# Patient Record
Sex: MALE | Race: OTHER
[De-identification: names, ages, dates, MRNs, and addresses within clinical notes are randomized per-mention and may not be internally consistent; named-entity substitution may affect disease eponyms.]

---

## 2019-10-17 ENCOUNTER — HOSPITAL ENCOUNTER (INPATIENT)
Dept: HOSPITAL 87 - ER | Age: 29
LOS: 2 days | Discharge: LEFT BEFORE DISCHARGE | DRG: 243 | End: 2019-10-19
Attending: INTERNAL MEDICINE | Admitting: INTERNAL MEDICINE
Payer: MEDICAID

## 2019-10-17 VITALS — DIASTOLIC BLOOD PRESSURE: 64 MMHG | SYSTOLIC BLOOD PRESSURE: 129 MMHG

## 2019-10-17 VITALS — WEIGHT: 132 LBS | HEIGHT: 64 IN | BODY MASS INDEX: 22.53 KG/M2

## 2019-10-17 VITALS — SYSTOLIC BLOOD PRESSURE: 128 MMHG | DIASTOLIC BLOOD PRESSURE: 81 MMHG

## 2019-10-17 VITALS — DIASTOLIC BLOOD PRESSURE: 90 MMHG | SYSTOLIC BLOOD PRESSURE: 140 MMHG

## 2019-10-17 DIAGNOSIS — R10.9: ICD-10-CM

## 2019-10-17 DIAGNOSIS — F12.90: ICD-10-CM

## 2019-10-17 DIAGNOSIS — K21.9: Primary | ICD-10-CM

## 2019-10-17 DIAGNOSIS — R07.9: ICD-10-CM

## 2019-10-17 DIAGNOSIS — Z53.29: ICD-10-CM

## 2019-10-17 DIAGNOSIS — R65.10: ICD-10-CM

## 2019-10-17 LAB
AMPHETAMINES UR QL SCN: NEGATIVE
APPEARANCE UR: CLEAR
APTT PPP: 28.4 SEC (ref 23.4–31)
BARBITURATES UR QL SCN: NEGATIVE
BASOPHILS NFR BLD AUTO: 0.4 % (ref 0–2)
BENZODIAZ UR QL SCN: NEGATIVE
BZE UR QL SCN: NEGATIVE
CANNABINOIDS UR QL SCN: (no result)
CHLORIDE SERPL-SCNC: 105 MEQ/L (ref 98–107)
CK MB SERPL-CCNC: < 1 NG/ML (ref 0.5–3.6)
CK SERPL-CCNC: 82 IU/L (ref 39–308)
COLOR UR: YELLOW
D DIMER PPP FEU-MCNC: 0.33 MG/L FEU (ref ?–0.5)
EOSINOPHIL NFR BLD AUTO: 0.5 % (ref 0–5)
ERYTHROCYTE [DISTWIDTH] IN BLOOD BY AUTOMATED COUNT: 15.3 % (ref 11.6–14.6)
HCT VFR BLD AUTO: 45 % (ref 42–52)
HGB BLD-MCNC: 15.2 G/DL (ref 14–18)
HGB UR QL STRIP: NEGATIVE
INR PPP: 0.9
KETONES UR STRIP-MCNC: NEGATIVE MG/DL
LEUKOCYTE ESTERASE UR QL STRIP: NEGATIVE
LYMPHOCYTES NFR BLD AUTO: 13.4 % (ref 20–50)
MCH RBC QN AUTO: 30.4 PG (ref 28–32)
MCV RBC AUTO: 90.1 FL (ref 80–94)
METHADONE UR QL SCN: NEGATIVE
MONOCYTES NFR BLD AUTO: 6.1 % (ref 2–8)
NEUTROPHILS NFR BLD AUTO: 79.6 % (ref 40–76)
NITRITE UR QL STRIP: NEGATIVE
OPIATES UR QL SCN: (no result)
PCP UR QL SCN: NEGATIVE
PH UR STRIP: 6.5 [PH] (ref 4.5–8)
PLATELET # BLD AUTO: 250 X1000/UL (ref 130–400)
PMV BLD AUTO: 8.6 FL (ref 7.4–10.4)
PROT UR QL STRIP: NEGATIVE
PROTHROMBIN TIME: 9.8 SEC (ref 9.6–11)
RBC # BLD AUTO: 4.99 MILL/UL (ref 4.7–6.1)
SP GR UR STRIP: 1.02 (ref 1–1.03)
UROBILINOGEN UR STRIP-MCNC: 1 E.U./DL (ref 0.2–1)

## 2019-10-17 PROCEDURE — 83036 HEMOGLOBIN GLYCOSYLATED A1C: CPT

## 2019-10-17 PROCEDURE — 71275 CT ANGIOGRAPHY CHEST: CPT

## 2019-10-17 PROCEDURE — 82553 CREATINE MB FRACTION: CPT

## 2019-10-17 PROCEDURE — 80061 LIPID PANEL: CPT

## 2019-10-17 PROCEDURE — 80305 DRUG TEST PRSMV DIR OPT OBS: CPT

## 2019-10-17 PROCEDURE — 83880 ASSAY OF NATRIURETIC PEPTIDE: CPT

## 2019-10-17 PROCEDURE — 82550 ASSAY OF CK (CPK): CPT

## 2019-10-17 PROCEDURE — 36415 COLL VENOUS BLD VENIPUNCTURE: CPT

## 2019-10-17 PROCEDURE — 85379 FIBRIN DEGRADATION QUANT: CPT

## 2019-10-17 PROCEDURE — 84484 ASSAY OF TROPONIN QUANT: CPT

## 2019-10-17 PROCEDURE — 84439 ASSAY OF FREE THYROXINE: CPT

## 2019-10-17 PROCEDURE — 99285 EMERGENCY DEPT VISIT HI MDM: CPT

## 2019-10-17 PROCEDURE — 71045 X-RAY EXAM CHEST 1 VIEW: CPT

## 2019-10-17 PROCEDURE — 84443 ASSAY THYROID STIM HORMONE: CPT

## 2019-10-17 PROCEDURE — 76700 US EXAM ABDOM COMPLETE: CPT

## 2019-10-17 PROCEDURE — 93005 ELECTROCARDIOGRAM TRACING: CPT

## 2019-10-17 PROCEDURE — 96374 THER/PROPH/DIAG INJ IV PUSH: CPT

## 2019-10-17 PROCEDURE — 93306 TTE W/DOPPLER COMPLETE: CPT

## 2019-10-17 PROCEDURE — 81003 URINALYSIS AUTO W/O SCOPE: CPT

## 2019-10-17 RX ADMIN — Medication SCH ML: at 13:21

## 2019-10-17 RX ADMIN — NITROGLYCERIN PRN MG: 0.4 TABLET SUBLINGUAL at 04:14

## 2019-10-17 RX ADMIN — NITROGLYCERIN PRN MG: 0.4 TABLET SUBLINGUAL at 04:08

## 2019-10-17 RX ADMIN — ENOXAPARIN SODIUM SCH MG: 100 INJECTION SUBCUTANEOUS at 12:11

## 2019-10-17 RX ADMIN — Medication SCH ML: at 22:38

## 2019-10-17 RX ADMIN — MORPHINE SULFATE PRN MG: 2 INJECTION, SOLUTION INTRAMUSCULAR; INTRAVENOUS at 13:21

## 2019-10-17 RX ADMIN — MORPHINE SULFATE PRN MG: 2 INJECTION, SOLUTION INTRAMUSCULAR; INTRAVENOUS at 17:53

## 2019-10-17 RX ADMIN — MORPHINE SULFATE PRN MG: 2 INJECTION, SOLUTION INTRAMUSCULAR; INTRAVENOUS at 09:13

## 2019-10-18 VITALS — DIASTOLIC BLOOD PRESSURE: 83 MMHG | SYSTOLIC BLOOD PRESSURE: 128 MMHG

## 2019-10-18 VITALS — SYSTOLIC BLOOD PRESSURE: 115 MMHG | DIASTOLIC BLOOD PRESSURE: 69 MMHG

## 2019-10-18 VITALS — DIASTOLIC BLOOD PRESSURE: 82 MMHG | SYSTOLIC BLOOD PRESSURE: 132 MMHG

## 2019-10-18 VITALS — DIASTOLIC BLOOD PRESSURE: 64 MMHG | SYSTOLIC BLOOD PRESSURE: 113 MMHG

## 2019-10-18 VITALS — DIASTOLIC BLOOD PRESSURE: 58 MMHG | SYSTOLIC BLOOD PRESSURE: 113 MMHG

## 2019-10-18 LAB
BASOPHILS NFR BLD AUTO: 0.3 % (ref 0–2)
CHLORIDE SERPL-SCNC: 103 MEQ/L (ref 98–107)
CK MB SERPL-CCNC: < 1 NG/ML (ref 0.5–3.6)
CK MB SERPL-CCNC: < 1 NG/ML (ref 0.5–3.6)
CK SERPL-CCNC: 73 IU/L (ref 39–308)
CK SERPL-CCNC: 82 IU/L (ref 39–308)
EOSINOPHIL NFR BLD AUTO: 2 % (ref 0–5)
ERYTHROCYTE [DISTWIDTH] IN BLOOD BY AUTOMATED COUNT: 15 % (ref 11.6–14.6)
HCT VFR BLD AUTO: 41.9 % (ref 42–52)
HDLC SERPL-MCNC: 50 MG/DL (ref 40–59)
HGB BLD-MCNC: 14 G/DL (ref 14–18)
LDLC SERPL DIRECT ASSAY-MCNC: 103 MG/DL (ref 5–100)
LYMPHOCYTES NFR BLD AUTO: 27.3 % (ref 20–50)
MCH RBC QN AUTO: 30.1 PG (ref 28–32)
MCV RBC AUTO: 90.1 FL (ref 80–94)
MONOCYTES NFR BLD AUTO: 9.7 % (ref 2–8)
NEUTROPHILS NFR BLD AUTO: 60.7 % (ref 40–76)
PLATELET # BLD AUTO: 213 X1000/UL (ref 130–400)
PMV BLD AUTO: 8.5 FL (ref 7.4–10.4)
RBC # BLD AUTO: 4.65 MILL/UL (ref 4.7–6.1)
T4 FREE SERPL-MCNC: 1.11 NG/DL (ref 0.76–1.46)

## 2019-10-18 RX ADMIN — MORPHINE SULFATE PRN MG: 2 INJECTION, SOLUTION INTRAMUSCULAR; INTRAVENOUS at 16:23

## 2019-10-18 RX ADMIN — Medication SCH ML: at 20:47

## 2019-10-18 RX ADMIN — Medication SCH ML: at 14:00

## 2019-10-18 RX ADMIN — ENOXAPARIN SODIUM SCH MG: 100 INJECTION SUBCUTANEOUS at 12:06

## 2019-10-18 RX ADMIN — MORPHINE SULFATE PRN MG: 2 INJECTION, SOLUTION INTRAMUSCULAR; INTRAVENOUS at 08:25

## 2020-10-22 ENCOUNTER — HOSPITAL ENCOUNTER (EMERGENCY)
Dept: HOSPITAL 72 - EMR | Age: 30
Discharge: HOME | End: 2020-10-22
Payer: SELF-PAY

## 2020-10-22 VITALS — BODY MASS INDEX: 23.05 KG/M2 | HEIGHT: 64 IN | WEIGHT: 135 LBS

## 2020-10-22 VITALS — DIASTOLIC BLOOD PRESSURE: 86 MMHG | SYSTOLIC BLOOD PRESSURE: 145 MMHG

## 2020-10-22 VITALS — SYSTOLIC BLOOD PRESSURE: 145 MMHG | DIASTOLIC BLOOD PRESSURE: 86 MMHG

## 2020-10-22 DIAGNOSIS — Z20.2: Primary | ICD-10-CM

## 2020-10-22 PROCEDURE — 99283 EMERGENCY DEPT VISIT LOW MDM: CPT

## 2020-10-22 PROCEDURE — 96372 THER/PROPH/DIAG INJ SC/IM: CPT

## 2020-10-22 NOTE — EMERGENCY ROOM REPORT
History of Present Illness


General


Chief Complaint:  Male Urogenital Problems


Source:  Patient





Present Illness


HPI


Disclaimer: Please note that this report is being documented using DRAGON 

technology. This can lead to erroneous entry secondary to incorrect 

interpretation by the dictating instrument.





HPI: 30-year male brought in for evaluation after STD exposure and penile 

leisure.  Patient states he had a call from an ex-girlfriend telling him to get 

tested but did not say for what.  This morning he noticed a raised and somewhat 

tender lesion at the tip of his penis.  No skin breakdown or ulceration.  Denies

purulence or drainage.  Denies dysuria, hematuria.  Denies fever, chills, 

abdominal pain or other symptoms.  No prior history of STI.


 


PMH: Reviewed


 


PSH: Reviewed


 


Allergies: Denied


 


Social Hx: Viewed


Allergies:  


Coded Allergies:  


     No Known Allergies (Unverified , 10/22/20)





COVID-19 Screening


Contact w/high risk pt:  No


Experienced COVID-19 symptoms?:  No


COVID-19 Testing performed PTA:  No





Nursing Documentation-PMH


Past Medical History:  No Stated History





Review of Systems


All Other Systems:  negative except mentioned in HPI





Physical Exam





Vital Signs








  Date Time  Temp Pulse Resp B/P (MAP) Pulse Ox O2 Delivery O2 Flow Rate FiO2


 


10/22/20 12:16 98.2 77 18 145/86 (105) 98 Room Air  





 





General: Awake and alert, no acute distress


HEENT: NC/AT. EOMI. 


Resp: Normal work of breathing


Abdomen: Soft, nontender, nondistended.


: Uncircumcised male.  Easily retractable foreskin.  Slightly raised and 

erythematous lesion at the tip of the glans.  No induration.  No ulceration.  

Does not appear fluid-filled.  No obvious vesicle.


Skin: Intact.  No abrasions, laceration or rash over the exposed skin


MSK: Normal tone and bulk. Moving all extremities.  No obvious deformity.


Neuro: Awake and alert.  Mentating appropriately





Medical Decision Making


Diagnostic Impression:  


   Primary Impression:  


   STD exposure


ER Course


Is a 30-year-old male presenting for evaluation of penile region and reported 

exposure to STI.  Differential includes but is not limited to gonorrhea, 

chlamydia, syphilis, HSV, HPV, HIV among others.  Patient resting empiric 

treatment for STDs.  Will treat with ceftriaxone and azithromycin for treatment 

of gonococcal disease and chlamydia.  Unfortunately pharmacy does not currently 

have penicillin in stock.  We will treat with doxycycline for 2 weeks to cover 

for syphilis.  Will refer to outpatient STD clinic for testing.  He understands 

and agrees with this treatment plan.





Last Vital Signs








  Date Time  Temp Pulse Resp B/P (MAP) Pulse Ox O2 Delivery O2 Flow Rate FiO2


 


10/22/20 12:16 98.2 77 18 145/86 (105) 98 Room Air  








Disposition:  HOME, SELF-CARE


Condition:  Stable


Scripts


Doxycycline Monohydrate* (DOXYCYCLINE MONOHYDRATE*) 100 Mg Capsule


100 MG ORAL Q12H for 14 Days, #28 CAP 0 Refills


   Prov: Dwight Rocha MD         10/22/20











Dwight Rocha MD              Oct 22, 2020 12:35

## 2020-10-27 ENCOUNTER — HOSPITAL ENCOUNTER (EMERGENCY)
Dept: HOSPITAL 72 - EMR | Age: 30
Discharge: HOME | End: 2020-10-27
Payer: SELF-PAY

## 2020-10-27 VITALS — SYSTOLIC BLOOD PRESSURE: 128 MMHG | DIASTOLIC BLOOD PRESSURE: 74 MMHG

## 2020-10-27 VITALS — BODY MASS INDEX: 22.49 KG/M2 | WEIGHT: 135 LBS | HEIGHT: 65 IN

## 2020-10-27 VITALS — SYSTOLIC BLOOD PRESSURE: 132 MMHG | DIASTOLIC BLOOD PRESSURE: 70 MMHG

## 2020-10-27 DIAGNOSIS — B07.9: ICD-10-CM

## 2020-10-27 DIAGNOSIS — B86: Primary | ICD-10-CM

## 2020-10-27 PROCEDURE — 99283 EMERGENCY DEPT VISIT LOW MDM: CPT

## 2020-10-27 PROCEDURE — 76870 US EXAM SCROTUM: CPT

## 2020-10-27 NOTE — EMERGENCY ROOM REPORT
History of Present Illness


General


Chief Complaint:  Male Urogenital Problems


Source:  Patient





Present Illness


HPI


30-year-old male with no signal past medical history who is here few days ago 

for treatment of possible sexually transmitted diseases here complaining of 

pressure in scrotum x2 days as well having genital warts and scabies in the same

area.  Patient reports that he came in contact with someone who has genital 

warts as well as scabies and is requesting treatment.  Denies any penile 

discharge, urinary frequency and urgency.  Denies any fever and chills, nausea 

vomiting, diffuse abdominal pain.  Reports the patient was already diagnosed 

with genital warts by his primary doctor


Allergies:  


Coded Allergies:  


     No Known Allergies (Unverified , 10/22/20)





COVID-19 Screening


Contact w/high risk pt:  No


Experienced COVID-19 symptoms?:  No


COVID-19 Testing performed PTA:  Yes


COVID-19 Screening:  Negative COVID-19


COVID-19 Testing Source:  throat





Patient History


Past Medical History:  see triage record


Past Surgical History:  none


Pertinent Family History:  none


Immunizations:  UTD


Reviewed Nursing Documentation:  PMH: Agreed; PSxH: Agreed





Nursing Documentation-PMH


Past Medical History:  No Stated History





Review of Systems


All Other Systems:  negative except mentioned in HPI





Physical Exam





Vital Signs








  Date Time  Temp Pulse Resp B/P (MAP) Pulse Ox O2 Delivery O2 Flow Rate FiO2


 


10/27/20 16:29 98.4 76 14 138/68 (91) 99 Room Air  








Sp02 EP Interpretation:  reviewed, normal


General Appearance:  no apparent distress, alert, GCS 15, non-toxic


Head:  normocephalic, atraumatic


Eyes:  bilateral eye normal inspection, bilateral eye PERRL


ENT:  hearing grossly normal, normal pharynx, no angioedema, normal voice


Neck:  full range of motion, supple/symm/no masses


Respiratory:  chest non-tender, lungs clear, normal breath sounds, speaking full

sentences


Cardiovascular #1:  regular rate, rhythm, no edema


Gastrointestinal:  normal bowel sounds, non tender, soft, non-distended, no 

guarding, no rebound


Rectal:  deferred


Genitourinary:  no CVA tenderness, other - Scabies rash noted in the size of and

suprapubic, genital warts noted on penile area


Musculoskeletal:  back normal


Neurologic:  alert, motor strength/tone normal, oriented x3, sensory intact, 

responsive, speech normal


Psychiatric:  judgement/insight normal, memory normal, mood/affect normal, no 

suicidal/homicidal ideation


Skin:  normal color


Lymphatic:  no adenopathy





Medical Decision Making


PA Attestation


All my diagnosis and treatment plans were reviewed ad discussed with my 

supervising physician Dr. Gallegos


Diagnostic Impression:  


   Primary Impression:  


   Genital warts


   Additional Impression:  


   Scabies


ER Course


30-year-old male with no signal past medical history who is here few days ago 

for treatment of possible sexually transmitted diseases here complaining of 

pressure in scrotum x2 days as well having genital warts and scabies in the same

area.  Patient reports that he came in contact with someone who has genital 

warts as well as scabies and is requesting treatment.  Denies any penile 

discharge, urinary frequency and urgency.  Denies any fever and chills, nausea 

vomiting, diffuse abdominal pain.  Reports the patient was already diagnosed 

with genital warts by his primary doctor





Ddx considered but are not limited to: UTI, genital warts, torsion, hydrocele, 

epididymitis, varicocele








Vital signs: are WNL, pt. is afebrile








 H&PE are most consistent with: Genital warts, scabies














ORDERS: UA, scrotal ultrasound, permethrin











ED INTERVENTIONS: None required at this time.























DISCHARGE: At this time pt. is stable for d/c to home. Will provide printed 

patient care instructions, and any necessary prescriptions. Care plan and follow

up instructions have been discussed with the patient prior to discharge.  Advise

d patient to follow-up with dermatologist for cryotherapy as patient also has 

warts on her arms.  If worsening symptoms return to the emergency room


CT/MRI/US Diagnostic Results


CT/MRI/US Diagnostic Results :  


   Imaging Test Ordered:  Scrotal ultrasound


   Impression


TECHNIQUE:


Real-time ultrasound of the scrotum with color Doppler and image documentation.





COMPARISON:


No relevant prior studies available.





FINDINGS:


Right testicle: Right testis measures about 3.2 x 1.8 x 4.1 cm. Questionable 

microcalcifications No torsion.


Left testicle: Left testis measures about 3.4 x 1.6 x 3.1 cm. Questionable 

microcalcifications No torsion.


Epididymides: Unremarkable.


Scrotum: Unremarkable.





IMPRESSION:





No acute findings in the scrotum.





Suspect bilateral testicular microcalcifications.





Last Vital Signs








  Date Time  Temp Pulse Resp B/P (MAP) Pulse Ox O2 Delivery O2 Flow Rate FiO2


 


10/27/20 17:40 98.0 79 18 132/70 99   


 


10/27/20 16:29      Room Air  








Disposition:  HOME, SELF-CARE


Condition:  Stable


Scripts


Permethrin* (ELIMITE*) 60 Gm Cream..g.


1 APPLIC TOPIC ONCE, #60 GM 0 Refills


   Apply cream from head to toe; leave on for 8-14 hours before washing


   off with


   water; may reapply in 1 week if live mites appear.


   Prov: Cyndi Tesfaye         10/27/20


Referrals:  


NOT CHOSEN [facility]/MD,REFERRING (PCP)


Patient Instructions:  Genital Warts, Scabies, Pediatric





Additional Instructions:  


Follow-up with dermatologist for cryotherapy, take medication as directed, if 

worsening symptoms return to the emergency room











Cyndi Tesfaye      Oct 27, 2020 18:55

## 2020-10-27 NOTE — DIAGNOSTIC IMAGING REPORT
EXAM:

  US Scrotum

 

CLINICAL HISTORY:

  MASS

 

TECHNIQUE:

  Real-time ultrasound of the scrotum with color Doppler and image 

documentation.

 

COMPARISON:

  No relevant prior studies available.

 

FINDINGS:

  Right testicle:  Right testis measures about 3.2 x 1.8 x 4.1 cm.  

Questionable microcalcifications  No torsion.

  Left testicle:  Left testis measures about 3.4 x 1.6 x 3.1 cm.  

Questionable microcalcifications  No torsion.

  Epididymides:  Unremarkable.

  Scrotum:  Unremarkable.

 

IMPRESSION:     

  

No acute findings in the scrotum.

 

Suspect bilateral testicular microcalcifications.

## 2020-10-27 NOTE — NUR
ED Nurse Note:

PT walked in to ed for C/O a lump to right testes with pain for few days.  pt 
denies any pain during urination.